# Patient Record
Sex: FEMALE | Race: BLACK OR AFRICAN AMERICAN | NOT HISPANIC OR LATINO
[De-identification: names, ages, dates, MRNs, and addresses within clinical notes are randomized per-mention and may not be internally consistent; named-entity substitution may affect disease eponyms.]

---
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
## 2022-07-19 NOTE — ED PEDIATRIC NURSE NOTE - CHIEF COMPLAINT
Shilpa reports her periods are irregular with Mirena IUD, occurring every 1 to 1.5 months and lasting 1-3 days with very light flow.    LMP: 6/14/22    Last Pap: 3/23/21 neg HPV neg      History of abnormal pap smear(s): No      History of sexually transmitted diseases:  No      Currently Sexually Active? Yes   Birth Control: Mirena IUD    Last mammogram: 7/7/22    Tobacco Use: Former smoker    Occupation:  - [employer]       Recent PHQ 2/9 Score    PHQ 2:  Date Adult PHQ 2 Score Adult PHQ 2 Interpretation   7/19/2022 0 No further screening needed       PHQ 9:  Date Adult PHQ 9 Score Adult PHQ 9 Interpretation   1/13/2020 1 Minimal Depression       Health Maintenance Due   Topic Date Due   • COVID-19 Vaccine (3 - Booster for Pfizer series) 07/12/2021       Patient is due for topics as listed above but is not proceeding with Immunization(s) COVID-19 at this time.      The patient is a 19y Female complaining of

## 2022-09-09 NOTE — ED ADULT TRIAGE NOTE - CCCP TRG CHIEF CMPLNT
Verified name and date of birth  Notified patient , no further action needed at this time. psychiatric evaluation

## 2024-10-14 NOTE — ED BEHAVIORAL HEALTH ASSESSMENT NOTE - NS ED BHA ED COURSE PSYCHIATRIC MEDICATION GIVEN
Addended by: SARA DOUGHERTY on: 10/14/2024 01:44 PM     Modules accepted: Level of Service     Involuntary Intramuscular (indication, name, dose, time)

## 2025-05-01 NOTE — ED BEHAVIORAL HEALTH ASSESSMENT NOTE - JUDGMENT (REGARDING EVERYDAY EVENTS)
Reason?: [id number]
Detail Level: Detailed
Reason?: Additional Information
Procedure (Limit To 20 Characters): liquid nitrogen
Payment Option: Option 1: Bill Medicare, await for decision on payment.
Poor